# Patient Record
Sex: MALE | Race: WHITE | NOT HISPANIC OR LATINO | URBAN - METROPOLITAN AREA
[De-identification: names, ages, dates, MRNs, and addresses within clinical notes are randomized per-mention and may not be internally consistent; named-entity substitution may affect disease eponyms.]

---

## 2022-01-31 ENCOUNTER — OFFICE VISIT (OUTPATIENT)
Dept: URBAN - METROPOLITAN AREA CLINIC 78 | Facility: CLINIC | Age: 22
End: 2022-01-31
Payer: COMMERCIAL

## 2022-01-31 ENCOUNTER — DASHBOARD ENCOUNTERS (OUTPATIENT)
Age: 22
End: 2022-01-31

## 2022-01-31 VITALS
WEIGHT: 185.5 LBS | DIASTOLIC BLOOD PRESSURE: 72 MMHG | SYSTOLIC BLOOD PRESSURE: 107 MMHG | HEART RATE: 80 BPM | HEIGHT: 71 IN | TEMPERATURE: 98.1 F | BODY MASS INDEX: 25.97 KG/M2

## 2022-01-31 DIAGNOSIS — R11.2 NAUSEA AND VOMITING, UNSPECIFIED VOMITING TYPE: ICD-10-CM

## 2022-01-31 DIAGNOSIS — R10.13 TENDERNESS AT MCBURNEY'S POINT: ICD-10-CM

## 2022-01-31 DIAGNOSIS — R19.5 LOOSE STOOLS: ICD-10-CM

## 2022-01-31 DIAGNOSIS — R10.9 ABDOMINAL CRAMPING: ICD-10-CM

## 2022-01-31 PROCEDURE — 99203 OFFICE O/P NEW LOW 30 MIN: CPT | Performed by: INTERNAL MEDICINE

## 2022-01-31 NOTE — PHYSICAL EXAM HENT:
Head,  normocephalic,  atraumatic,  Face,  Face within normal limits,  Ears,  External ears within normal limits,  Nose/Nasopharynx,  External nose  normal appearance
Immediate family member

## 2022-01-31 NOTE — HPI-TODAY'S VISIT:
Patient was referred by Dr. Vito Mares  A copy of this document will be sent to the physician.  Patient reports GI issues x 5-6 months   Patient has ongoing issues "GI " all his life . He dealt with constipation    Patient reports intermittent throwing up and thinks it is anxiety related Epsode "wake up in morning within 20 mts nausea/vomiting followed by throwing up lasting 10 mts " . It could be dry heaving , bile or projective  Notices issues with acid reflux , vaping  ( 4-5 years , former smoker ) recreational Marijuana ( 1-2 gm a day  heavily during quarantine )  Has been doing research on [facility]    Reports abdominal pain , diffuse lower abdominal  Painful BM   Recently liquid stools not solid  Andrea bathroom trips   He also reports weight loss   He also reports change in bowels   He admits to struggle with eating in general    He was on abilify in past and is currently working on findings a psychiatrist